# Patient Record
Sex: FEMALE | Race: WHITE
[De-identification: names, ages, dates, MRNs, and addresses within clinical notes are randomized per-mention and may not be internally consistent; named-entity substitution may affect disease eponyms.]

---

## 2017-03-06 ENCOUNTER — APPOINTMENT (OUTPATIENT)
Dept: DERMATOLOGY | Facility: CLINIC | Age: 37
End: 2017-03-06

## 2017-03-06 DIAGNOSIS — Z87.2 PERSONAL HISTORY OF DISEASES OF THE SKIN AND SUBCUTANEOUS TISSUE: ICD-10-CM

## 2017-03-06 RX ORDER — FLUTICASONE PROPIONATE 50 UG/1
50 SPRAY, METERED NASAL
Qty: 16 | Refills: 0 | Status: DISCONTINUED | COMMUNITY
Start: 2017-01-08 | End: 2017-03-06

## 2017-03-06 RX ORDER — AZITHROMYCIN 250 MG/1
250 TABLET, FILM COATED ORAL
Qty: 6 | Refills: 0 | Status: DISCONTINUED | COMMUNITY
Start: 2017-01-08 | End: 2017-03-06

## 2018-03-09 ENCOUNTER — MEDICATION RENEWAL (OUTPATIENT)
Age: 38
End: 2018-03-09

## 2018-03-12 ENCOUNTER — APPOINTMENT (OUTPATIENT)
Dept: DERMATOLOGY | Facility: CLINIC | Age: 38
End: 2018-03-12
Payer: COMMERCIAL

## 2018-03-12 VITALS — BODY MASS INDEX: 41.01 KG/M2 | WEIGHT: 220 LBS | HEIGHT: 61.5 IN

## 2018-03-12 DIAGNOSIS — D22.9 MELANOCYTIC NEVI, UNSPECIFIED: ICD-10-CM

## 2018-03-12 DIAGNOSIS — L30.8 OTHER SPECIFIED DERMATITIS: ICD-10-CM

## 2018-03-12 PROCEDURE — 99214 OFFICE O/P EST MOD 30 MIN: CPT

## 2018-06-27 ENCOUNTER — APPOINTMENT (OUTPATIENT)
Dept: FAMILY MEDICINE | Facility: CLINIC | Age: 38
End: 2018-06-27

## 2018-07-19 ENCOUNTER — APPOINTMENT (OUTPATIENT)
Dept: OBGYN | Facility: CLINIC | Age: 38
End: 2018-07-19

## 2019-02-21 ENCOUNTER — APPOINTMENT (OUTPATIENT)
Dept: FAMILY MEDICINE | Facility: CLINIC | Age: 39
End: 2019-02-21

## 2019-03-02 ENCOUNTER — APPOINTMENT (OUTPATIENT)
Dept: FAMILY MEDICINE | Facility: CLINIC | Age: 39
End: 2019-03-02

## 2019-03-18 ENCOUNTER — APPOINTMENT (OUTPATIENT)
Dept: DERMATOLOGY | Facility: CLINIC | Age: 39
End: 2019-03-18

## 2019-04-27 ENCOUNTER — APPOINTMENT (OUTPATIENT)
Dept: OBGYN | Facility: CLINIC | Age: 39
End: 2019-04-27

## 2019-08-21 ENCOUNTER — RX RENEWAL (OUTPATIENT)
Age: 39
End: 2019-08-21

## 2020-07-14 ENCOUNTER — APPOINTMENT (OUTPATIENT)
Dept: FAMILY MEDICINE | Facility: CLINIC | Age: 40
End: 2020-07-14

## 2023-04-10 ENCOUNTER — NON-APPOINTMENT (OUTPATIENT)
Age: 43
End: 2023-04-10

## 2023-05-31 ENCOUNTER — APPOINTMENT (OUTPATIENT)
Dept: INTERNAL MEDICINE | Facility: CLINIC | Age: 43
End: 2023-05-31
Payer: MEDICAID

## 2023-05-31 ENCOUNTER — NON-APPOINTMENT (OUTPATIENT)
Age: 43
End: 2023-05-31

## 2023-05-31 ENCOUNTER — TRANSCRIPTION ENCOUNTER (OUTPATIENT)
Age: 43
End: 2023-05-31

## 2023-05-31 VITALS
BODY MASS INDEX: 46.41 KG/M2 | HEART RATE: 89 BPM | DIASTOLIC BLOOD PRESSURE: 131 MMHG | RESPIRATION RATE: 16 BRPM | SYSTOLIC BLOOD PRESSURE: 185 MMHG | TEMPERATURE: 98 F | WEIGHT: 249 LBS | OXYGEN SATURATION: 98 % | HEIGHT: 61.5 IN

## 2023-05-31 VITALS — DIASTOLIC BLOOD PRESSURE: 120 MMHG | SYSTOLIC BLOOD PRESSURE: 200 MMHG

## 2023-05-31 DIAGNOSIS — Z00.00 ENCOUNTER FOR GENERAL ADULT MEDICAL EXAMINATION W/OUT ABNORMAL FINDINGS: ICD-10-CM

## 2023-05-31 DIAGNOSIS — Z23 ENCOUNTER FOR IMMUNIZATION: ICD-10-CM

## 2023-05-31 PROCEDURE — 99386 PREV VISIT NEW AGE 40-64: CPT | Mod: 25

## 2023-05-31 PROCEDURE — 36415 COLL VENOUS BLD VENIPUNCTURE: CPT

## 2023-05-31 PROCEDURE — 93000 ELECTROCARDIOGRAM COMPLETE: CPT

## 2023-05-31 NOTE — HEALTH RISK ASSESSMENT
[Yes] : Yes [Monthly or less (1 pt)] : Monthly or less (1 point) [1 or 2 (0 pts)] : 1 or 2 (0 points) [Never (0 pts)] : Never (0 points) [No] : In the past 12 months have you used drugs other than those required for medical reasons? No [0] : 2) Feeling down, depressed, or hopeless: Not at all (0) [PHQ-2 Negative - No further assessment needed] : PHQ-2 Negative - No further assessment needed [Patient reported PAP Smear was normal] : Patient reported PAP Smear was normal [HIV Test offered] : HIV Test offered [Hepatitis C test offered] : Hepatitis C test offered [With Family] : lives with family [Employed] : employed [Single] : single [Never] : Never [Audit-CScore] : 1 [[id number]] : 0 [PapSmearDate] : 01/2019 [PapSmearComments] : she has upcoming appt with gyn.  [FreeTextEntry2] :  of  center

## 2023-05-31 NOTE — PAST MEDICAL HISTORY
[Menstruating] : menstruating [Definite ___ (Date)] : the last menstrual period was [unfilled] [Total Preg ___] : G[unfilled] [Living ___] : Living: [unfilled] [AB Induced ___] : elective abortions: [unfilled]  [AB Spont ___] : miscarriages: [unfilled]

## 2023-05-31 NOTE — HISTORY OF PRESENT ILLNESS
[FreeTextEntry1] : cpe [de-identified] : 42y F w/ PMhx anxiety presenting to establish care/ cpe. \par \par she was prescribed a medication in the past a few years ago for HTN but she had issues with insurance and did not pick it up. \par \par anxiety: she takes lexapro 10mg once daily. she sees psych Dr. Miya Quintero in [place]. sees via telehealth. \par \par takes elderberry gummies, mvt\par \par she applies ketoconazole cream to her face for dermatitis. \par \par she was sent to a cardiologist 10 years ago bc of an abnormal EKG. she did see a cardiologist who repeated it and she was told it was normal. ECHO was also done which was normal. \par \par salty diet. she drinks iced tea occasionally. does not take nsaids regularly. has gained 25 lb since covid. her diet worsened since.

## 2023-05-31 NOTE — ASSESSMENT
[FreeTextEntry1] : Physical Exam:\par Constitutional: No acute distress, well appearing\par HEENT: Normocephalic, atraumatic\par Neck: supple\par Cardiac:  Regular rate and rhythm, No murmurs\par Pulmonary: No respiratory distress, Lungs clear to auscultation bilaterally, no wheezing, rales, or rhonchi\par Abdomen: Soft, non-tender, non-distended, no guarding, normal bowel sounds\par Vascular: No peripheral edema\par Neurology: Coordination grossly intact, no focal deficits\par Psychiatric: Alert and oriented x3, normal mood\par \par \par \par A/P\par HCM:\par - she will get fasting bloodwork done at lab\par - TDAP- will get today\par - Breast CA screening- due, given script\par - cervical CA screening- due, she has upcoming appt with gyn\par - has work forms to be completed- will get PPD and have it read in 48hours. \par \par HTN\par BP very elevated\par asymptomatic\par EKG wnl\par -rec low salt diet\par - will send amlodpine 5mg daily\par - pt knows to call the office if develops any new sx, lightheaded, dizziness, leg swelling\par - f/u 1month\par \par anxiety: \par controlled\par - c/w lexapro as directed by psych\par \par obesity\par - discussed lifestyle modif including healthy dieting and exercise\par - nutritioniste referral provided

## 2023-06-01 LAB
ALBUMIN SERPL ELPH-MCNC: 4.4 G/DL
ALP BLD-CCNC: 107 U/L
ALT SERPL-CCNC: 32 U/L
ANION GAP SERPL CALC-SCNC: 21 MMOL/L
AST SERPL-CCNC: 34 U/L
BILIRUB SERPL-MCNC: 0.5 MG/DL
BUN SERPL-MCNC: 12 MG/DL
CALCIUM SERPL-MCNC: 10 MG/DL
CHLORIDE SERPL-SCNC: 103 MMOL/L
CHOLEST SERPL-MCNC: 235 MG/DL
CO2 SERPL-SCNC: 20 MMOL/L
CREAT SERPL-MCNC: 1.13 MG/DL
EGFR: 62 ML/MIN/1.73M2
ESTIMATED AVERAGE GLUCOSE: 128 MG/DL
GLUCOSE SERPL-MCNC: 92 MG/DL
HBA1C MFR BLD HPLC: 6.1 %
HCV AB SER QL: NONREACTIVE
HCV S/CO RATIO: 0.12 S/CO
HDLC SERPL-MCNC: 49 MG/DL
HIV1+2 AB SPEC QL IA.RAPID: NONREACTIVE
LDLC SERPL CALC-MCNC: 156 MG/DL
NONHDLC SERPL-MCNC: 186 MG/DL
POTASSIUM SERPL-SCNC: 4.9 MMOL/L
PROT SERPL-MCNC: 7.1 G/DL
SODIUM SERPL-SCNC: 143 MMOL/L
T4 FREE SERPL-MCNC: 1.2 NG/DL
TRIGL SERPL-MCNC: 147 MG/DL
TSH SERPL-ACNC: 1.98 UIU/ML

## 2023-06-05 ENCOUNTER — NON-APPOINTMENT (OUTPATIENT)
Age: 43
End: 2023-06-05

## 2023-06-05 ENCOUNTER — MED ADMIN CHARGE (OUTPATIENT)
Age: 43
End: 2023-06-05

## 2023-06-05 ENCOUNTER — APPOINTMENT (OUTPATIENT)
Dept: FAMILY MEDICINE | Facility: CLINIC | Age: 43
End: 2023-06-05
Payer: MEDICAID

## 2023-06-05 PROCEDURE — 90715 TDAP VACCINE 7 YRS/> IM: CPT

## 2023-06-05 PROCEDURE — 86580 TB INTRADERMAL TEST: CPT

## 2023-06-05 PROCEDURE — 90471 IMMUNIZATION ADMIN: CPT

## 2023-06-06 ENCOUNTER — FORM ENCOUNTER (OUTPATIENT)
Age: 43
End: 2023-06-06

## 2023-06-06 ENCOUNTER — OUTPATIENT (OUTPATIENT)
Dept: OUTPATIENT SERVICES | Facility: HOSPITAL | Age: 43
LOS: 1 days | End: 2023-06-06
Payer: COMMERCIAL

## 2023-06-06 ENCOUNTER — RESULT REVIEW (OUTPATIENT)
Age: 43
End: 2023-06-06

## 2023-06-06 ENCOUNTER — APPOINTMENT (OUTPATIENT)
Dept: MAMMOGRAPHY | Facility: CLINIC | Age: 43
End: 2023-06-06
Payer: MEDICAID

## 2023-06-06 DIAGNOSIS — Z00.00 ENCOUNTER FOR GENERAL ADULT MEDICAL EXAMINATION WITHOUT ABNORMAL FINDINGS: ICD-10-CM

## 2023-06-06 PROCEDURE — 77067 SCR MAMMO BI INCL CAD: CPT | Mod: 26

## 2023-06-06 PROCEDURE — 77063 BREAST TOMOSYNTHESIS BI: CPT

## 2023-06-06 PROCEDURE — 77063 BREAST TOMOSYNTHESIS BI: CPT | Mod: 26

## 2023-06-06 PROCEDURE — 77067 SCR MAMMO BI INCL CAD: CPT

## 2023-06-09 ENCOUNTER — TRANSCRIPTION ENCOUNTER (OUTPATIENT)
Age: 43
End: 2023-06-09

## 2023-06-27 ENCOUNTER — APPOINTMENT (OUTPATIENT)
Dept: INTERNAL MEDICINE | Facility: CLINIC | Age: 43
End: 2023-06-27
Payer: MEDICAID

## 2023-06-27 VITALS
BODY MASS INDEX: 46.41 KG/M2 | SYSTOLIC BLOOD PRESSURE: 142 MMHG | WEIGHT: 249 LBS | OXYGEN SATURATION: 98 % | HEIGHT: 61.5 IN | HEART RATE: 79 BPM | RESPIRATION RATE: 16 BRPM | TEMPERATURE: 98.6 F | DIASTOLIC BLOOD PRESSURE: 92 MMHG

## 2023-06-27 VITALS — SYSTOLIC BLOOD PRESSURE: 160 MMHG | DIASTOLIC BLOOD PRESSURE: 100 MMHG

## 2023-06-27 DIAGNOSIS — D72.829 ELEVATED WHITE BLOOD CELL COUNT, UNSPECIFIED: ICD-10-CM

## 2023-06-27 LAB
CHOLEST SERPL-MCNC: 270 MG/DL
HDLC SERPL-MCNC: 50 MG/DL
LDLC SERPL CALC-MCNC: 196 MG/DL
NONHDLC SERPL-MCNC: 220 MG/DL
TRIGL SERPL-MCNC: 119 MG/DL

## 2023-06-27 PROCEDURE — 36415 COLL VENOUS BLD VENIPUNCTURE: CPT

## 2023-06-27 PROCEDURE — 99214 OFFICE O/P EST MOD 30 MIN: CPT | Mod: 25

## 2023-06-27 NOTE — ASSESSMENT
[FreeTextEntry1] : Physical Exam:\par Constitutional: No acute distress, well appearing\par HEENT: Normocephalic, atraumatic\par Neck: supple\par Cardiac:  Regular rate and rhythm, No murmurs\par Pulmonary: No respiratory distress, Lungs clear to auscultation bilaterally, no wheezing, rales, or rhonchi\par Abdomen: Soft, non-tender, non-distended, no guarding, normal bowel sounds\par Vascular: No peripheral edema\par Neurology: Coordination grossly intact, no focal deficits\par Psychiatric: Alert and oriented x3, normal mood\par \par \par \par A/P:\par anxiety:\par mood controlled\par - c/w lexapro as per psych\par \par HTN: \par improved albeit still elevated\par - will increase amlodipine to 10mg \par - f/u 1 month for BP recheck\par \par preDM:\par - discussed lifestyle modif including healthy diet and exercise regualrly. \par - will check a1c again in 3 months/ septmeber\par \par HLD \par was elev last visit but not done fasting\par she is fasting today\par - will recheck today\par \par bmi 45-\par - f/u bariatrics\par \par leukocytosis\par mild, seen on labs from last month\par asymptomatic\par - will recheck today

## 2023-06-27 NOTE — HISTORY OF PRESENT ILLNESS
[FreeTextEntry1] : f/u [de-identified] : 42y f w/ pMHx anxiety, HTN, HLD, preDM, presenting for f/u.\par \par HTN: started on amlo 5 last visit. complaint w/ her medication. takes in the morning.  today is 1 month f/u. states she borrowed a BP machine from the [employer] and found that her BP has gradually been improving.\par \par bmi 45- she has not met with the nutritionist however she is interested in meeting with the bariatrics team now for nonsurgical options as she admits shes been having a lot of difficulty with weight loss.

## 2023-07-27 ENCOUNTER — APPOINTMENT (OUTPATIENT)
Dept: INTERNAL MEDICINE | Facility: CLINIC | Age: 43
End: 2023-07-27
Payer: MEDICAID

## 2023-07-27 VITALS
HEART RATE: 70 BPM | DIASTOLIC BLOOD PRESSURE: 97 MMHG | TEMPERATURE: 98 F | BODY MASS INDEX: 46.41 KG/M2 | SYSTOLIC BLOOD PRESSURE: 139 MMHG | OXYGEN SATURATION: 99 % | HEIGHT: 61.5 IN | WEIGHT: 249 LBS | RESPIRATION RATE: 15 BRPM

## 2023-07-27 PROCEDURE — 99214 OFFICE O/P EST MOD 30 MIN: CPT

## 2023-07-27 NOTE — ASSESSMENT
[FreeTextEntry1] : Physical Exam:\par Constitutional: No acute distress, well appearing\par HEENT: Normocephalic, atraumatic\par Neck: supple\par Cardiac:  Regular rate and rhythm, No murmurs\par Pulmonary: No respiratory distress, Lungs clear to auscultation bilaterally, no wheezing, rales, or rhonchi\par Abdomen: Soft, non-tender, non-distended, no guarding, normal bowel sounds\par Vascular: No peripheral edema\par Neurology: Coordination grossly intact, no focal deficits\par Psychiatric: Alert and oriented x3, normal mood\par \par \par \par A/P:\par HTN: \par BP has significantly improved since initial visit however still above goal\par EKG done in may wnl\par asymptomatic\par - pt agreeable to add lisinopril 10mg daily\par - pt w/ multiple cardiovascular risk factors including obesity, HTN, HLD, preDM and family hx cardiac disease. she was recommended for cardiac evaluation- pt agreeable, given referral\par - rec to f/u 6 weeks for reevaluation. rec to rtc sooner or call the office if develops any dizziness, lightheadedness or sig changes in her BP in the interim. rec to continue to monitor her BP at home. \par \par anxiety: \par - c/w lexapro as directed by psych\par \par preDM;\par - c/w lifestyle modif including healthy dieting and exercise. rec to cut down carb use and weight loss\par - will check a1c at next visit\par \par HLD; \par - c/w atorvastatin\par - rec low cholesterol diet and weight loss\par - will check fasting lipids at next visit\par \par BMI 45\par she is making effort to lose weight\par - rec to c/w llifestyle modif and weight loss

## 2023-08-02 ENCOUNTER — TRANSCRIPTION ENCOUNTER (OUTPATIENT)
Age: 43
End: 2023-08-02

## 2023-08-15 ENCOUNTER — TRANSCRIPTION ENCOUNTER (OUTPATIENT)
Age: 43
End: 2023-08-15

## 2023-09-06 ENCOUNTER — APPOINTMENT (OUTPATIENT)
Dept: DERMATOLOGY | Facility: CLINIC | Age: 43
End: 2023-09-06
Payer: MEDICAID

## 2023-09-06 ENCOUNTER — NON-APPOINTMENT (OUTPATIENT)
Age: 43
End: 2023-09-06

## 2023-09-06 DIAGNOSIS — L21.9 SEBORRHEIC DERMATITIS, UNSPECIFIED: ICD-10-CM

## 2023-09-06 DIAGNOSIS — D18.01 HEMANGIOMA OF SKIN AND SUBCUTANEOUS TISSUE: ICD-10-CM

## 2023-09-06 DIAGNOSIS — D22.5 MELANOCYTIC NEVI OF TRUNK: ICD-10-CM

## 2023-09-06 DIAGNOSIS — L82.0 INFLAMED SEBORRHEIC KERATOSIS: ICD-10-CM

## 2023-09-06 PROCEDURE — 17110 DESTRUCTION B9 LES UP TO 14: CPT

## 2023-09-06 PROCEDURE — 99204 OFFICE O/P NEW MOD 45 MIN: CPT | Mod: 25

## 2023-09-06 RX ORDER — MOMETASONE FUROATE 1 MG/G
0.1 OINTMENT TOPICAL
Qty: 1 | Refills: 0 | Status: DISCONTINUED | COMMUNITY
Start: 2018-03-12 | End: 2023-09-06

## 2023-09-06 NOTE — ASSESSMENT
[FreeTextEntry1] : Complete skin examination is negative for malignancy; Multiple new concerns were addressed and discussed. Therapeutic options and their risks and benefits; along with multiple diagnostic possibilities were discussed at length; risks and benefits of skin biopsy and/or other further study were discussed;  cryo to inflamed SK R thigh  renew ketoconazole cream for face, mometasone soln for ears- seb derm  Continue regular exams; Follow up for TBSE in 1 year

## 2023-09-06 NOTE — HISTORY OF PRESENT ILLNESS
[de-identified] : Pt. presents for skin check; c/o few spots of concern;  irritated growth R thigh Severity:  mild   Modifying factors:  none Associated symptoms:  none Context:  no association with activity  also:  hx seb derm tx with Rxs;

## 2023-09-06 NOTE — PHYSICAL EXAM
[Full Body Skin Exam Performed] : performed [FreeTextEntry3] : Skin examination performed of the face, neck, trunk, arms, legs;  The patient is well, alert and oriented, pleasant and cooperative. Eyelids, conjunctivae, oral mucosa, digits and nails all normal.   No cervical adenopathy.  Normal findings include:  Multiple benign nevi were noted.  Scaling patches located on scalp;  Angiomas Lentigines + inflamed, waxy, keratotic papule; R anterior thigh  No lesions were suspicious for malignancy.

## 2023-09-28 ENCOUNTER — RX RENEWAL (OUTPATIENT)
Age: 43
End: 2023-09-28

## 2023-09-29 ENCOUNTER — TRANSCRIPTION ENCOUNTER (OUTPATIENT)
Age: 43
End: 2023-09-29

## 2023-10-03 ENCOUNTER — NON-APPOINTMENT (OUTPATIENT)
Age: 43
End: 2023-10-03

## 2023-10-04 ENCOUNTER — APPOINTMENT (OUTPATIENT)
Dept: INTERNAL MEDICINE | Facility: CLINIC | Age: 43
End: 2023-10-04
Payer: MEDICAID

## 2023-10-04 VITALS
TEMPERATURE: 97.6 F | HEIGHT: 61.5 IN | DIASTOLIC BLOOD PRESSURE: 84 MMHG | WEIGHT: 242 LBS | RESPIRATION RATE: 15 BRPM | OXYGEN SATURATION: 97 % | SYSTOLIC BLOOD PRESSURE: 128 MMHG | HEART RATE: 75 BPM | BODY MASS INDEX: 45.11 KG/M2

## 2023-10-04 DIAGNOSIS — R73.03 PREDIABETES.: ICD-10-CM

## 2023-10-04 DIAGNOSIS — M62.830 MUSCLE SPASM OF BACK: ICD-10-CM

## 2023-10-04 PROCEDURE — 36415 COLL VENOUS BLD VENIPUNCTURE: CPT

## 2023-10-04 PROCEDURE — G0008: CPT

## 2023-10-04 PROCEDURE — 90686 IIV4 VACC NO PRSV 0.5 ML IM: CPT

## 2023-10-04 PROCEDURE — 99214 OFFICE O/P EST MOD 30 MIN: CPT | Mod: 25

## 2023-10-05 LAB
ALBUMIN SERPL ELPH-MCNC: 4.2 G/DL
ALP BLD-CCNC: 99 U/L
ALT SERPL-CCNC: 22 U/L
ANION GAP SERPL CALC-SCNC: 13 MMOL/L
AST SERPL-CCNC: 21 U/L
BILIRUB SERPL-MCNC: 0.3 MG/DL
BUN SERPL-MCNC: 13 MG/DL
CALCIUM SERPL-MCNC: 9.4 MG/DL
CHLORIDE SERPL-SCNC: 104 MMOL/L
CHOLEST SERPL-MCNC: 166 MG/DL
CO2 SERPL-SCNC: 22 MMOL/L
CREAT SERPL-MCNC: 1.01 MG/DL
EGFR: 71 ML/MIN/1.73M2
ESTIMATED AVERAGE GLUCOSE: 131 MG/DL
GLUCOSE SERPL-MCNC: 105 MG/DL
HBA1C MFR BLD HPLC: 6.2 %
HDLC SERPL-MCNC: 45 MG/DL
LDLC SERPL CALC-MCNC: 105 MG/DL
NONHDLC SERPL-MCNC: 121 MG/DL
POTASSIUM SERPL-SCNC: 4.4 MMOL/L
PROT SERPL-MCNC: 6.6 G/DL
SODIUM SERPL-SCNC: 139 MMOL/L
TRIGL SERPL-MCNC: 81 MG/DL

## 2023-10-23 ENCOUNTER — APPOINTMENT (OUTPATIENT)
Dept: OBGYN | Facility: CLINIC | Age: 43
End: 2023-10-23
Payer: MEDICAID

## 2023-10-23 VITALS
DIASTOLIC BLOOD PRESSURE: 84 MMHG | HEART RATE: 68 BPM | SYSTOLIC BLOOD PRESSURE: 130 MMHG | BODY MASS INDEX: 44.73 KG/M2 | WEIGHT: 240 LBS | RESPIRATION RATE: 16 BRPM | HEIGHT: 61.5 IN

## 2023-10-23 DIAGNOSIS — Z01.419 ENCOUNTER FOR GYNECOLOGICAL EXAMINATION (GENERAL) (ROUTINE) W/OUT ABNORMAL FINDINGS: ICD-10-CM

## 2023-10-23 PROCEDURE — 99386 PREV VISIT NEW AGE 40-64: CPT

## 2023-10-25 LAB — HPV HIGH+LOW RISK DNA PNL CVX: NOT DETECTED

## 2023-10-26 ENCOUNTER — TRANSCRIPTION ENCOUNTER (OUTPATIENT)
Age: 43
End: 2023-10-26

## 2023-10-26 LAB — CYTOLOGY CVX/VAG DOC THIN PREP: NORMAL

## 2023-10-30 ENCOUNTER — TRANSCRIPTION ENCOUNTER (OUTPATIENT)
Age: 43
End: 2023-10-30

## 2023-11-03 ENCOUNTER — NON-APPOINTMENT (OUTPATIENT)
Age: 43
End: 2023-11-03

## 2023-11-03 ENCOUNTER — APPOINTMENT (OUTPATIENT)
Dept: CARDIOLOGY | Facility: CLINIC | Age: 43
End: 2023-11-03

## 2023-11-14 ENCOUNTER — TRANSCRIPTION ENCOUNTER (OUTPATIENT)
Age: 43
End: 2023-11-14

## 2023-11-23 ENCOUNTER — NON-APPOINTMENT (OUTPATIENT)
Age: 43
End: 2023-11-23

## 2023-11-24 ENCOUNTER — TRANSCRIPTION ENCOUNTER (OUTPATIENT)
Age: 43
End: 2023-11-24

## 2023-12-12 ENCOUNTER — APPOINTMENT (OUTPATIENT)
Dept: DERMATOLOGY | Facility: CLINIC | Age: 43
End: 2023-12-12
Payer: MEDICAID

## 2023-12-12 DIAGNOSIS — D48.5 NEOPLASM OF UNCERTAIN BEHAVIOR OF SKIN: ICD-10-CM

## 2023-12-12 DIAGNOSIS — D22.30 MELANOCYTIC NEVI OF UNSPECIFIED PART OF FACE: ICD-10-CM

## 2023-12-12 PROCEDURE — 99213 OFFICE O/P EST LOW 20 MIN: CPT | Mod: 25

## 2023-12-12 PROCEDURE — 11102 TANGNTL BX SKIN SINGLE LES: CPT

## 2023-12-19 ENCOUNTER — TRANSCRIPTION ENCOUNTER (OUTPATIENT)
Age: 43
End: 2023-12-19

## 2023-12-19 LAB — CORE LAB BIOPSY: NORMAL

## 2024-01-17 ENCOUNTER — APPOINTMENT (OUTPATIENT)
Dept: INTERNAL MEDICINE | Facility: CLINIC | Age: 44
End: 2024-01-17
Payer: MEDICAID

## 2024-01-17 VITALS
HEART RATE: 78 BPM | TEMPERATURE: 97.6 F | DIASTOLIC BLOOD PRESSURE: 85 MMHG | OXYGEN SATURATION: 99 % | SYSTOLIC BLOOD PRESSURE: 137 MMHG | HEIGHT: 61.5 IN | WEIGHT: 245 LBS | BODY MASS INDEX: 45.66 KG/M2 | RESPIRATION RATE: 15 BRPM

## 2024-01-17 PROCEDURE — 36415 COLL VENOUS BLD VENIPUNCTURE: CPT

## 2024-01-17 PROCEDURE — 99214 OFFICE O/P EST MOD 30 MIN: CPT | Mod: 25

## 2024-01-17 NOTE — HISTORY OF PRESENT ILLNESS
[FreeTextEntry1] : f/u [de-identified] :   RUBIN STYLES is a 43 year old female with PMHx anxiety, HTN, HLD, preDM, obesity, presenting for f/u.

## 2024-01-17 NOTE — ASSESSMENT
[FreeTextEntry1] : Physical Exam: Constitutional: No acute distress, well appearing HEENT: Normocephalic, atraumatic Neck: supple Cardiac: Regular rate and rhythm, No murmurs Pulmonary: No respiratory distress, Lungs clear to auscultation bilaterally, no wheezing, rales, or rhonchi Abdomen: Soft, non-tender, non-distended, no guarding, normal bowel sounds Vascular: No peripheral edema Neurology: Coordination grossly intact, no focal deficits Psychiatric: Alert and oriented x3, normal mood       A/P: HTN: on lisinopril 10mg and amlodipine 10mg daily bp a little elevated but still within acceptable range pt w/ multiple cardiovascular risk factors including obesity, HTN, HLD, preDM and family hx cardiac disease. she was recommended for cardiac evaluation- states she did not go yet, provided referral again today - c/w lisinopril 10mg daily and amlodipine 10mg daily - salt and caffeine reduction - f/u 3 months   anxiety: on lexapro 10mg daily, prescribed by her psychiatrist. - c/w lexapro as directed by psych  preDM; last a1c 6.2 with slight increase from labs october - c/w lifestyle modif including healthy dieting and exercise. rec to cut down carb use and weight loss - will check today- bw drawn in office  HLD; lipid improvement, october labs - rec low cholesterol diet and continued weight loss - c/w atorvastatin 40mg daily  BMI 45 she has upcoming visit with Dr Villafana for weight loss - c/w lifestyle modif

## 2024-01-17 NOTE — HISTORY OF PRESENT ILLNESS
[FreeTextEntry1] : f/u [de-identified] :   RUBIN STYLES is a 43 year old female with PMHx anxiety, HTN, HLD, preDM, obesity, presenting for f/u.

## 2024-01-18 LAB
ALBUMIN SERPL ELPH-MCNC: 4.2 G/DL
ALP BLD-CCNC: 94 U/L
ALT SERPL-CCNC: 15 U/L
ANION GAP SERPL CALC-SCNC: 15 MMOL/L
AST SERPL-CCNC: 16 U/L
BASOPHILS # BLD AUTO: 0.08 K/UL
BASOPHILS NFR BLD AUTO: 1.1 %
BILIRUB SERPL-MCNC: 0.5 MG/DL
BUN SERPL-MCNC: 13 MG/DL
CALCIUM SERPL-MCNC: 9.4 MG/DL
CHLORIDE SERPL-SCNC: 102 MMOL/L
CO2 SERPL-SCNC: 21 MMOL/L
CREAT SERPL-MCNC: 0.9 MG/DL
EGFR: 81 ML/MIN/1.73M2
EOSINOPHIL # BLD AUTO: 0.41 K/UL
EOSINOPHIL NFR BLD AUTO: 5.5 %
ESTIMATED AVERAGE GLUCOSE: 123 MG/DL
GLUCOSE SERPL-MCNC: 82 MG/DL
HBA1C MFR BLD HPLC: 5.9 %
HCT VFR BLD CALC: 41.4 %
HGB BLD-MCNC: 13.4 G/DL
IMM GRANULOCYTES NFR BLD AUTO: 0.3 %
LYMPHOCYTES # BLD AUTO: 2.65 K/UL
LYMPHOCYTES NFR BLD AUTO: 35.5 %
MAN DIFF?: NORMAL
MCHC RBC-ENTMCNC: 27.1 PG
MCHC RBC-ENTMCNC: 32.4 GM/DL
MCV RBC AUTO: 83.8 FL
MONOCYTES # BLD AUTO: 0.41 K/UL
MONOCYTES NFR BLD AUTO: 5.5 %
NEUTROPHILS # BLD AUTO: 3.9 K/UL
NEUTROPHILS NFR BLD AUTO: 52.1 %
PLATELET # BLD AUTO: 387 K/UL
POTASSIUM SERPL-SCNC: 4.8 MMOL/L
PROT SERPL-MCNC: 6.7 G/DL
RBC # BLD: 4.94 M/UL
RBC # FLD: 14 %
SODIUM SERPL-SCNC: 138 MMOL/L
WBC # FLD AUTO: 7.47 K/UL

## 2024-01-19 ENCOUNTER — TRANSCRIPTION ENCOUNTER (OUTPATIENT)
Age: 44
End: 2024-01-19

## 2024-01-20 ENCOUNTER — NON-APPOINTMENT (OUTPATIENT)
Age: 44
End: 2024-01-20

## 2024-02-01 ENCOUNTER — APPOINTMENT (OUTPATIENT)
Dept: BARIATRICS | Facility: CLINIC | Age: 44
End: 2024-02-01
Payer: MEDICAID

## 2024-02-01 VITALS
HEIGHT: 61 IN | HEART RATE: 74 BPM | WEIGHT: 249 LBS | SYSTOLIC BLOOD PRESSURE: 140 MMHG | DIASTOLIC BLOOD PRESSURE: 86 MMHG | TEMPERATURE: 96.7 F | OXYGEN SATURATION: 99 % | BODY MASS INDEX: 47.01 KG/M2

## 2024-02-01 DIAGNOSIS — Z82.49 FAMILY HISTORY OF ISCHEMIC HEART DISEASE AND OTHER DISEASES OF THE CIRCULATORY SYSTEM: ICD-10-CM

## 2024-02-01 DIAGNOSIS — Z83.3 FAMILY HISTORY OF DIABETES MELLITUS: ICD-10-CM

## 2024-02-01 DIAGNOSIS — Z80.51 FAMILY HISTORY OF MALIGNANT NEOPLASM OF KIDNEY: ICD-10-CM

## 2024-02-01 PROCEDURE — 99204 OFFICE O/P NEW MOD 45 MIN: CPT

## 2024-02-01 RX ORDER — METHOCARBAMOL 500 MG/1
500 TABLET, FILM COATED ORAL
Qty: 14 | Refills: 0 | Status: DISCONTINUED | COMMUNITY
Start: 2023-10-04 | End: 2024-02-01

## 2024-02-01 RX ORDER — NALTREXONE HYDROCHLORIDE 50 MG/1
50 TABLET, FILM COATED ORAL DAILY
Qty: 15 | Refills: 0 | Status: COMPLETED | COMMUNITY
Start: 2024-02-01 | End: 2024-03-02

## 2024-02-01 NOTE — HISTORY OF PRESENT ILLNESS
[de-identified] : RUBIN STYLES is a 43 year old F with a long-standing h/o obesity, who presents today for initial evaluation for weight management options.    Reports no history of substance abuse, uncontrolled blood pressure, kidney stones, or pancreatitis; reports a Hx of anxiety. Reports no family history of thyroid cancer or MEN 2 syndrome.   Heaviest/current wt 250 lbs, lowest wt 108 lbs. Goal weight: 125 lbs Diets/exercise programs tried in the past: WW, intermittent fasting Weight loss medications tried in the past: None Reason for stopping weight loss medication if applicable: N/A   History of bariatric surgery: No Obesity comorbidities: HTN, HLD, LINDA (not using CPAP) Comorbidities improved/resolved: N/A Anti-obesity medication: None Obesity medication side effects: N/A   No abdominal pain, reflux, nausea, vomiting, constipation, or diarrhea.   Current dietary lifestyle: Breakfast: Skips or coffee with creamer, sometimes a bagel, doughnut, or hash browns  Lunch: [employer] tomato soup, grilled cheese, chips, fettuccini isabella, grilled chicken and penne with balsamic  Dinner: Take-out (e.g. pizza/fast food) Snacks: Candy Drinks: Water (trying to drink 64 oz water/day), seltzer, iced tea, Diet Coke, Ginger Ale (reports drinks ~1 sweet drink per day; e.g. 20 oz Diet Coke with lunch)  Activity Lifestyle:  Sleep: 6-7 hrs Physical activity/exercise: Daily activity (e.g. ~30 minutes of exercise/day per pt's Apple Watch) Work:  of  Center Smoking/ETOH:  Nonsmoker; no ETOH use

## 2024-02-01 NOTE — ASSESSMENT
[FreeTextEntry1] : RUBIN STYLES is a 43 year old F with a long-standing h/o obesity, who presents today for initial evaluation for weight management options.  I had a lengthy discussion with the patient regarding nutrition, exercise, weight loss medications, and bariatric surgery. The patient qualifies for bariatric surgery but is not interested at this time, however she is not opposed to possible surgery in the future. We reviewed surgical options such as the gastric Hugo-en-Y bypass and Sleeve Gastrectomy, and the risks and benefits of each. We discussed how bariatric surgery may offer greater weight loss results with better long-term success. The patient qualifies for and is currently most interested in weight loss medications. The following risks of bariatric surgery were discussed with the patient with diagrams. All questions answered.   Complications discussed include but are not limited to: vitamin and protein deficiencies, pneumonia, urinary infection, wound infection, leaks/peritonitis possibly requiring intraabdominal drains or reoperation, bleeding, DVT, pulmonary embolus, severe reflux, sleeve obstruction, abdominal wall hernias, revisions, death, inadequate weight loss. Increased risk of revision surgery was also discussed with the patient. The importance of vitamins and protein supplementation was stressed, as was the importance of follow-up and exercise.   Health maintenance and behavioral/nutrition counseling for obesity: An additional 30 minutes of the visit was spent counseling the patient regarding need for aggressive weight loss and behavior modification including nutrition and proper eating habits, including which foods to eat and avoid.   I emphasized the importance of making healthier food choices including fresh fruits and vegetables, lean meats, and protein sources. I recommended front loading calories, incorporating whole grains, and eliminating fast foods. I also discussed the importance of avoiding fried/fatty foods and foods containing high sugar content including juices/shakes/sodas/desserts.   I also encouraged beginning an exercise program and recommended cardiovascular exercises along with strength training to build lean muscle. I made suggestions on different types of exercises to try.   Patient will work on the following: -Complete blood work (ordered at today's visit) -Meet with nutritionist -Meet with the psychologist -Eliminate snacks -Focus on eating 3 well-balanced meals during the daytime with appropriate portion size (protein source list,   cooking Ideas packet, and protein shake list given to pt at today's visit) -Cooking fresh meals rather than take out/processed/ready-made foods -Incorporating exercise (exercise packet given to pt at today's visit)   I have discussed initiating Contrave therapy for pt. All risks and benefits have been discussed with the patient. Risks include but are not limited to nausea, vomiting, constipation, diarrhea and GI upset. Pt verbalizes understanding and wishes to proceed with medical therapy. Instructions for use provided.   Discussed with the pt of the warnings of pregnancy while on Contrave:  - instructed pt to avoid planned pregnancy and use contraception  - advised pt to stop Contrave immediately if becomes pregnant   Pt verbalized understanding of the above   Start Contrave based on blood work, authorization, and tolerance.   Patient educated to call with questions/concerns   All questions answered   Schedule follow-up for 3 weeks after starting weight loss medication.   Additional time spent before and after visit reviewing chart.   Pt seen with Dr Villafana

## 2024-02-01 NOTE — PHYSICAL EXAM
[Obese, well nourished, in no acute distress] : obese, well nourished, in no acute distress [Normal] : supple without JVD, no thyromegaly or masses appreciated [de-identified] : Equal chest rise, non-labored respirations, no audible wheezing. [de-identified] : soft, NT, ND, no evidence of hernia or diastasis; obese [de-identified] : DAIJA

## 2024-02-14 ENCOUNTER — APPOINTMENT (OUTPATIENT)
Dept: BARIATRICS/WEIGHT MGMT | Facility: CLINIC | Age: 44
End: 2024-02-14
Payer: MEDICAID

## 2024-02-14 DIAGNOSIS — R63.5 ABNORMAL WEIGHT GAIN: ICD-10-CM

## 2024-02-14 PROCEDURE — 97802 MEDICAL NUTRITION INDIV IN: CPT | Mod: 95

## 2024-02-15 VITALS — WEIGHT: 250 LBS | BODY MASS INDEX: 47.2 KG/M2 | HEIGHT: 61 IN

## 2024-02-15 PROBLEM — R63.5 WEIGHT GAIN: Status: ACTIVE | Noted: 2024-02-01

## 2024-02-21 ENCOUNTER — APPOINTMENT (OUTPATIENT)
Dept: BARIATRICS | Facility: CLINIC | Age: 44
End: 2024-02-21
Payer: MEDICAID

## 2024-02-21 VITALS
HEIGHT: 61 IN | OXYGEN SATURATION: 98 % | HEART RATE: 71 BPM | DIASTOLIC BLOOD PRESSURE: 78 MMHG | SYSTOLIC BLOOD PRESSURE: 136 MMHG | BODY MASS INDEX: 46.44 KG/M2 | WEIGHT: 246 LBS | TEMPERATURE: 96.7 F

## 2024-02-21 PROCEDURE — 99214 OFFICE O/P EST MOD 30 MIN: CPT

## 2024-02-21 RX ORDER — PHENTERMINE HYDROCHLORIDE 37.5 MG/1
37.5 TABLET ORAL DAILY
Qty: 15 | Refills: 0 | Status: COMPLETED | COMMUNITY
Start: 2024-02-21 | End: 2024-03-22

## 2024-02-21 RX ORDER — TOPIRAMATE 25 MG/1
25 TABLET, FILM COATED ORAL
Qty: 30 | Refills: 0 | Status: COMPLETED | COMMUNITY
Start: 2024-02-21 | End: 2024-03-22

## 2024-02-21 RX ORDER — BUPROPION HYDROCHLORIDE 150 MG/1
150 TABLET, FILM COATED ORAL DAILY
Refills: 0 | Status: DISCONTINUED | COMMUNITY
End: 2024-02-21

## 2024-02-21 NOTE — PHYSICAL EXAM
[Obese, well nourished, in no acute distress] : obese, well nourished, in no acute distress [Normal] : affect appropriate [de-identified] : Equal chest rise, non-labored respirations, no audible wheezing. [de-identified] : soft, NT, ND, no evidence of umbilical hernia or diastasis

## 2024-02-21 NOTE — ASSESSMENT
[FreeTextEntry1] : 43 year old F with a longstanding history of obesity presents for a weight-loss medication follow-up after having made nutrition and exercise changes since last visit. 3 weight loss since last visit 1 month ago. Pt took bupropion and naltrexone only 1 week and stopped due to nausea, headache and still muscles.   I had a lengthy discussion with the patient regarding nutrition, exercise, weight loss medications.   Patient will work on the following: -Meet with nutritionist and follow up with nutrition classes -Eliminate snacks -Increase zero calorie fluid intake to at least 64 oz/day -Focus on eating 3 well-balanced meals during the daytime with appropriate portion size. Discussed increasing protein intake. -Cooking fresh meals rather than take out/processed/ready-made foods -Incorporating exercise; walk 8-10k steps daily -Change medication from bupropion and naltrexone to phentermine and topiramate -Pt is aware that she must get a pregnancy test prior to starting medications. She states she is not sexually active. She will go for the pregnancy test today and not start medications until after the results come back.   Discussed with the pt of the warnings of pregnancy while on phentermine and topiramate. - instructed pt to avoid planned pregnancy and use of contraception - advised pt to stop immediately if becomes pregnant   Pt verbalized understanding of the above Pt will call office immediately for any side effects. Pt verbalizes understanding and wishes to proceed with medical therapy. Patient educated to call with questions/concerns. All questions answered.   Return to office 2-3 weeks.   Additional time spent before and after visit reviewing chart.

## 2024-02-21 NOTE — HISTORY OF PRESENT ILLNESS
[de-identified] : 43 year old F with a longstanding history of obesity presents for a weight-loss medication follow-up after having made nutrition and exercise changes since last visit. 3 weight loss since last visit 1 month ago. Pt took bupropion and naltrexone only 1 week and stopped due to nausea, headache and still muscles. Reports no abdominal pain, nausea/vomiting, constipation, diarrhea, reflux/heartburn currently.   Patient trying to eat 3 protein-rich meals/day (B:low fat yogurt, banana, coffee with 1/2 and 1/2, L: sandwich- cold cut turkey, 1 slice of american cheese, D: chicken, pasta, potato, rice, minimal vegetables) and snacking on 2 cookies per night, drinking adequate zero-calorie fluids/day, and exercising by daily activies; sleeps 6-8 hrs/night. Pt is interested in changing medication due to side effects.   Starting weight at initial consult: 249 Current weight at today's visit: 246   Anti-obesity medication(s): bupropion, naltrexone Start date: 2/1/24 Current dose:  bupropion 100 mg daily, naltrexone 25 mg daily Side-effects from anti-obesity medication(s): nausea, head ache, still muscles Obesity comorbidities: HTN, HLD Comorbidities improved/resolved: no History of bariatric surgery: no   Next nutritionist appointment 3/11/24

## 2024-02-22 ENCOUNTER — TRANSCRIPTION ENCOUNTER (OUTPATIENT)
Age: 44
End: 2024-02-22

## 2024-02-22 LAB — HCG SERPL-MCNC: <1 MIU/ML

## 2024-03-08 RX ORDER — BUPROPION HYDROCHLORIDE 100 MG/1
100 TABLET, FILM COATED, EXTENDED RELEASE ORAL
Qty: 42 | Refills: 0 | Status: DISCONTINUED | COMMUNITY
Start: 2024-02-01 | End: 2024-03-08

## 2024-03-11 ENCOUNTER — APPOINTMENT (OUTPATIENT)
Dept: BARIATRICS/WEIGHT MGMT | Facility: CLINIC | Age: 44
End: 2024-03-11

## 2024-03-13 ENCOUNTER — APPOINTMENT (OUTPATIENT)
Dept: BARIATRICS | Facility: CLINIC | Age: 44
End: 2024-03-13

## 2024-03-19 ENCOUNTER — APPOINTMENT (OUTPATIENT)
Dept: BARIATRICS/WEIGHT MGMT | Facility: CLINIC | Age: 44
End: 2024-03-19
Payer: MEDICAID

## 2024-03-19 DIAGNOSIS — Z78.9 OTHER SPECIFIED HEALTH STATUS: ICD-10-CM

## 2024-03-19 PROCEDURE — 90791 PSYCH DIAGNOSTIC EVALUATION: CPT | Mod: GT,95
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
## 2024-03-19 NOTE — HISTORY OF PRESENT ILLNESS
[Genetics] : genetics [Poor Choices] : poor choices [de-identified] : Pt's motivation for weight loss is to improve her health and avoid further medical sequelae of obesity.  Per pt,her highest weight is her current weight and her lowest weight was 110 lbs at 20 yrs old.  Her stated goal weight is 150-160 lbs or at least under 200 lbs.  First started on Contrave but c/o muscle tension/rigidity and switched to Phentermine/Topiramate.   [de-identified] : A current typical day of eating is reported as follows: B: previously DD bagel, hash browns or a donut w/coffee and half & half;  now yogurt L:  [employer] tomato soup,1/2 grilled cheese, chips and seltzer or diet soda D:  pizza or chicken nuggets or pasta Drinks water, seltzer and some diet soda. [de-identified] : none.  Pt denies ED hx and bxs, including binge eating.

## 2024-03-19 NOTE — CURRENT PSYCHIATRIC SYMPTOMS
[Depressed Mood] : no depressed mood [Decreased Concentration] : no decrease in concentrating ability [Euphoria] : no euphoria [Dec Need For Sleep] : no decreased need for sleep [Thought Disorder] : ~T a thought disorder was not noted [de-identified] : 6 hrs/sleep per night as she stays up late and needs to wake early; depression began during the pandemic (2020) and effectively treated with Lexapro [de-identified] : anxiety since childhood with excessive worries about her health leading to PAs improved on Lexapro [FreeTextEntry1] : Karyn saw a psychologist in her early 20s for anxiety and improving her eating choices. Also saw a therapist at [facility] sometime between [id number].  Trial of Lexapro briefly in 2010.  Also trial of Sertraline in 2016.  Since 2020, she's been in treatment with Miya Ocampo MD for anxiety and prescribed Lexapro.

## 2024-03-19 NOTE — PHYSICAL EXAM
[Normal] : good [AH] : no auditory hallucinations [VH] : no visual hallucinations [Suicidal Ideation] : no suicidal ideation [FreeTextEntry8] : "good"

## 2024-03-19 NOTE — DISCUSSION/SUMMARY
[Behavior plan developed] : Behavior plan developed [Addtnl Health & Behavior Intervention: Individual] : Additional Health and Behavior Intervention: Individual [Cognitive Behavioral psychotherapy to remove barriers to self-care] : Cognitive Behavioral psychotherapy to remove barriers to self-care. [FreeTextEntry1] : Karyn is a 43 yr old female referred by Dr. Villafana for behavioral weight loss counseling.  She attributes her obesity to poor dietary choices, relying on outsourced food, and avoidance of fruit and veggies. [de-identified] : Illness Anxiety Disorder Psychological factors (poor dietary choices) affecting other medical conditions (obesity and associated medical sequelae) Obesity Adjustment Disorder with Depressed Mood, In Remission R/O ARFID [de-identified] : weight loss of 1-2 lbs/wk incorporate fruits and veggies into her diet

## 2024-03-19 NOTE — REASON FOR VISIT
[Other Location: e.g. School (Enter Location, City,State)___] : at [unfilled], at the time of the visit. [Other Location: e.g. Home (Enter Location, City,State)___] : at [unfilled] [Patient] : the patient [Initial Consult] : an initial consult for [Morbid Obesity (BMI>40)] : morbid obesity (bmi>40) [Referring By:  ___] : ~[address]~ was referred for psychological evaluation by Dr. VILLEGAS [de-identified] : for evaluation and tx of psyc sxs related to obesity [de-identified] : Confidentiality and its limitations were explained.

## 2024-04-19 ENCOUNTER — RX RENEWAL (OUTPATIENT)
Age: 44
End: 2024-04-19

## 2024-04-25 ENCOUNTER — APPOINTMENT (OUTPATIENT)
Dept: INTERNAL MEDICINE | Facility: CLINIC | Age: 44
End: 2024-04-25
Payer: COMMERCIAL

## 2024-04-25 VITALS
HEART RATE: 86 BPM | BODY MASS INDEX: 47.2 KG/M2 | SYSTOLIC BLOOD PRESSURE: 125 MMHG | HEIGHT: 61 IN | OXYGEN SATURATION: 96 % | DIASTOLIC BLOOD PRESSURE: 84 MMHG | TEMPERATURE: 98.1 F | RESPIRATION RATE: 16 BRPM | WEIGHT: 250 LBS

## 2024-04-25 DIAGNOSIS — M54.50 LOW BACK PAIN, UNSPECIFIED: ICD-10-CM

## 2024-04-25 DIAGNOSIS — E66.01 MORBID (SEVERE) OBESITY DUE TO EXCESS CALORIES: ICD-10-CM

## 2024-04-25 DIAGNOSIS — F41.9 ANXIETY DISORDER, UNSPECIFIED: ICD-10-CM

## 2024-04-25 DIAGNOSIS — I10 ESSENTIAL (PRIMARY) HYPERTENSION: ICD-10-CM

## 2024-04-25 DIAGNOSIS — E78.5 HYPERLIPIDEMIA, UNSPECIFIED: ICD-10-CM

## 2024-04-25 DIAGNOSIS — R73.03 PREDIABETES.: ICD-10-CM

## 2024-04-25 PROCEDURE — G2211 COMPLEX E/M VISIT ADD ON: CPT

## 2024-04-25 PROCEDURE — 99214 OFFICE O/P EST MOD 30 MIN: CPT

## 2024-04-25 NOTE — HISTORY OF PRESENT ILLNESS
[FreeTextEntry1] : f/u [de-identified] : RUBIN STYLES is a 43 year old female with PMHx anxiety, HTN, HLD, preDM, obesity, presenting for f/u. also c/o low back pain

## 2024-05-07 ENCOUNTER — APPOINTMENT (OUTPATIENT)
Dept: BARIATRICS/WEIGHT MGMT | Facility: CLINIC | Age: 44
End: 2024-05-07

## 2024-05-09 ENCOUNTER — TRANSCRIPTION ENCOUNTER (OUTPATIENT)
Age: 44
End: 2024-05-09

## 2024-05-22 ENCOUNTER — OUTPATIENT (OUTPATIENT)
Dept: OUTPATIENT SERVICES | Facility: HOSPITAL | Age: 44
LOS: 1 days | End: 2024-05-22
Payer: COMMERCIAL

## 2024-05-22 ENCOUNTER — APPOINTMENT (OUTPATIENT)
Dept: RADIOLOGY | Facility: CLINIC | Age: 44
End: 2024-05-22
Payer: COMMERCIAL

## 2024-05-22 DIAGNOSIS — M54.50 LOW BACK PAIN, UNSPECIFIED: ICD-10-CM

## 2024-05-22 PROCEDURE — 72110 X-RAY EXAM L-2 SPINE 4/>VWS: CPT

## 2024-05-22 PROCEDURE — 72110 X-RAY EXAM L-2 SPINE 4/>VWS: CPT | Mod: 26

## 2024-05-24 ENCOUNTER — NON-APPOINTMENT (OUTPATIENT)
Age: 44
End: 2024-05-24

## 2024-06-13 ENCOUNTER — APPOINTMENT (OUTPATIENT)
Dept: PHYSICAL MEDICINE AND REHAB | Facility: CLINIC | Age: 44
End: 2024-06-13

## 2024-06-27 ENCOUNTER — APPOINTMENT (OUTPATIENT)
Dept: PHYSICAL MEDICINE AND REHAB | Facility: CLINIC | Age: 44
End: 2024-06-27
Payer: COMMERCIAL

## 2024-06-27 VITALS
RESPIRATION RATE: 14 BRPM | HEART RATE: 80 BPM | SYSTOLIC BLOOD PRESSURE: 114 MMHG | WEIGHT: 250 LBS | HEIGHT: 62 IN | DIASTOLIC BLOOD PRESSURE: 76 MMHG | BODY MASS INDEX: 46.01 KG/M2

## 2024-06-27 DIAGNOSIS — M47.816 SPONDYLOSIS W/OUT MYELOPATHY OR RADICULOPATHY, LUMBAR REGION: ICD-10-CM

## 2024-06-27 DIAGNOSIS — M51.36 OTHER INTERVERTEBRAL DISC DEGENERATION, LUMBAR REGION: ICD-10-CM

## 2024-06-27 PROCEDURE — G2211 COMPLEX E/M VISIT ADD ON: CPT

## 2024-06-27 PROCEDURE — 99204 OFFICE O/P NEW MOD 45 MIN: CPT

## 2024-07-12 ENCOUNTER — APPOINTMENT (OUTPATIENT)
Dept: MRI IMAGING | Facility: CLINIC | Age: 44
End: 2024-07-12

## 2024-07-18 ENCOUNTER — OUTPATIENT (OUTPATIENT)
Dept: OUTPATIENT SERVICES | Facility: HOSPITAL | Age: 44
LOS: 1 days | End: 2024-07-18
Payer: COMMERCIAL

## 2024-07-18 ENCOUNTER — APPOINTMENT (OUTPATIENT)
Dept: MRI IMAGING | Facility: CLINIC | Age: 44
End: 2024-07-18
Payer: COMMERCIAL

## 2024-07-18 DIAGNOSIS — M47.816 SPONDYLOSIS WITHOUT MYELOPATHY OR RADICULOPATHY, LUMBAR REGION: ICD-10-CM

## 2024-07-18 PROCEDURE — 72148 MRI LUMBAR SPINE W/O DYE: CPT

## 2024-07-18 PROCEDURE — 72148 MRI LUMBAR SPINE W/O DYE: CPT | Mod: 26

## 2024-07-29 ENCOUNTER — APPOINTMENT (OUTPATIENT)
Dept: PHYSICAL MEDICINE AND REHAB | Facility: CLINIC | Age: 44
End: 2024-07-29
Payer: COMMERCIAL

## 2024-07-29 DIAGNOSIS — M47.816 SPONDYLOSIS W/OUT MYELOPATHY OR RADICULOPATHY, LUMBAR REGION: ICD-10-CM

## 2024-07-29 PROCEDURE — 99214 OFFICE O/P EST MOD 30 MIN: CPT | Mod: 95

## 2024-07-29 PROCEDURE — G2211 COMPLEX E/M VISIT ADD ON: CPT | Mod: 95

## 2024-07-29 NOTE — DATA REVIEWED
[FreeTextEntry1] : EXAM: [id number] - MR SPINE LUMBAR  - ORDERED BY: KAREN OTTO   PROCEDURE DATE:  07/18/2024    INTERPRETATION:  MR LUMBAR SPINE WITHOUT CONTRAST  TECHNIQUE: Multiplanar multisequence imaging of the lumbar spine was performed without the administration of intravenous contrast.  CLINICAL INDICATION: Persistent low back pain for several months. Lumbar spondylosis. COMPARISON: Lumbar spine radiographs 5/22/2024. ____________________ FINDINGS: Mildly degraded by motion artifact. L5-S1 is at image 46 of series 10. There are 5 lumbar type vertebral bodies. Sacralization of L5 bilaterally.  Very mild lower lumbar levoscoliosis with an apex near L4. Very mild upper lumbar dextroscoliosis with an apex near L1-L2. Lumbar alignment otherwise maintained. Visualized vertebral body heights are maintained. No acute fractures. No aggressive osseous lesions.  Conus has a normal appearance and terminates at L1.  Decreased T2 signal of the majority of the lumbar disks compatible with degenerative disc disease.  T12-L1: Disc bulge. No significant disc height loss. No significant spinal canal or neuroforaminal stenosis. Very mild hypertrophic facet joint degeneration.  L1-L2: Disc bulge. No significant disc height loss. No significant spinal canal or neuroforaminal stenosis. Mild hypertrophic facet joint degeneration with bilateral facet joint effusions.  L2-L3: Disc bulge. No significant disc height loss. No significant spinal canal or neuroforaminal stenosis. Mild hypertrophic facet joint degeneration.  L3-L4: Disc bulge. No significant disc height loss. No significant spinal canal or neuroforaminal stenosis. Moderate hypertrophic facet joint degeneration.  L4-L5: Disc bulge. No significant disc height loss. No significant spinal canal or neuroforaminal stenosis. Severe hypertrophic facet joint degeneration.  L5-S1: Disc bulge. No significant disc height loss. No significant spinal canal or neuroforaminal stenosis. Mild hypertrophic facet joint degeneration.  Paraspinal Soft Tissues/Retroperitoneum: Unremarkable. ____________________ IMPRESSION:  1.  Multilevel lumbar spondylosis as detailed above with no significant spinal canal or neuroforaminal stenosis..  --- End of Report ---       AMADOR SMALL MD; Attending Radiologist This document has been electronically signed. Jul 23 2024 10:15AM
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
## 2024-07-29 NOTE — ASSESSMENT
[FreeTextEntry1] : Ms. RUBIN STYLES is a 43 year old female who presents with chronic low back pain, likely due to underlying spondylosis. Denies any red flag signs. Will recommend: - MRI L Spine reviewed with patient - Will continue PT 2-3x/week for stretching, strengthening (especially of core muscles), ROM exercises, HEP and modalities PRN including myofascial release, moist heat - Discussed R/B/A of injection therapy such as MBB/RFAs for which we will await to see how she does with more PT.  RTC in 4 weeks. Patient aware of red flag signs including any changes to their bowel/bladder control, groin numbness or new weakness. Patient knows to seek immediate attention by calling 911 or going to nearest ER if these symptoms appear.  This patient is being managed for a complex chronic pain that requires ongoing medical management. The nature of this condition requires a longitudinal relationship and monitoring over time for appropriate treatment.
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
## 2024-07-29 NOTE — DATA REVIEWED
[FreeTextEntry1] : EXAM: [id number] - MR SPINE LUMBAR  - ORDERED BY: KAREN OTTO   PROCEDURE DATE:  07/18/2024    INTERPRETATION:  MR LUMBAR SPINE WITHOUT CONTRAST  TECHNIQUE: Multiplanar multisequence imaging of the lumbar spine was performed without the administration of intravenous contrast.  CLINICAL INDICATION: Persistent low back pain for several months. Lumbar spondylosis. COMPARISON: Lumbar spine radiographs 5/22/2024. ____________________ FINDINGS: Mildly degraded by motion artifact. L5-S1 is at image 46 of series 10. There are 5 lumbar type vertebral bodies. Sacralization of L5 bilaterally.  Very mild lower lumbar levoscoliosis with an apex near L4. Very mild upper lumbar dextroscoliosis with an apex near L1-L2. Lumbar alignment otherwise maintained. Visualized vertebral body heights are maintained. No acute fractures. No aggressive osseous lesions.  Conus has a normal appearance and terminates at L1.  Decreased T2 signal of the majority of the lumbar disks compatible with degenerative disc disease.  T12-L1: Disc bulge. No significant disc height loss. No significant spinal canal or neuroforaminal stenosis. Very mild hypertrophic facet joint degeneration.  L1-L2: Disc bulge. No significant disc height loss. No significant spinal canal or neuroforaminal stenosis. Mild hypertrophic facet joint degeneration with bilateral facet joint effusions.  L2-L3: Disc bulge. No significant disc height loss. No significant spinal canal or neuroforaminal stenosis. Mild hypertrophic facet joint degeneration.  L3-L4: Disc bulge. No significant disc height loss. No significant spinal canal or neuroforaminal stenosis. Moderate hypertrophic facet joint degeneration.  L4-L5: Disc bulge. No significant disc height loss. No significant spinal canal or neuroforaminal stenosis. Severe hypertrophic facet joint degeneration.  L5-S1: Disc bulge. No significant disc height loss. No significant spinal canal or neuroforaminal stenosis. Mild hypertrophic facet joint degeneration.  Paraspinal Soft Tissues/Retroperitoneum: Unremarkable. ____________________ IMPRESSION:  1.  Multilevel lumbar spondylosis as detailed above with no significant spinal canal or neuroforaminal stenosis..  --- End of Report ---       AMADOR SMALL MD; Attending Radiologist This document has been electronically signed. Jul 23 2024 10:15AM

## 2024-08-06 ENCOUNTER — APPOINTMENT (OUTPATIENT)
Dept: INTERNAL MEDICINE | Facility: CLINIC | Age: 44
End: 2024-08-06

## 2024-08-06 ENCOUNTER — APPOINTMENT (OUTPATIENT)
Dept: FAMILY MEDICINE | Facility: CLINIC | Age: 44
End: 2024-08-06

## 2024-08-06 ENCOUNTER — NON-APPOINTMENT (OUTPATIENT)
Age: 44
End: 2024-08-06

## 2024-08-06 PROBLEM — Z86.018 HISTORY OF CHERRY ANGIOMA: Status: RESOLVED | Noted: 2017-03-06 | Resolved: 2024-08-06

## 2024-08-06 PROBLEM — Z86.018 HISTORY OF NEVUS: Status: RESOLVED | Noted: 2017-03-06 | Resolved: 2024-08-06

## 2024-08-06 PROBLEM — L30.8 ASTEATOTIC DERMATITIS: Status: RESOLVED | Noted: 2018-03-12 | Resolved: 2024-08-06

## 2024-08-06 PROBLEM — Z87.2 HISTORY OF SEBORRHEIC DERMATITIS: Status: RESOLVED | Noted: 2017-03-06 | Resolved: 2024-08-06

## 2024-08-06 PROBLEM — Z00.00 ANNUAL PHYSICAL EXAM: Status: ACTIVE | Noted: 2024-08-06

## 2024-08-06 PROBLEM — G47.33 OBSTRUCTIVE SLEEP APNEA, ADULT: Status: ACTIVE | Noted: 2024-08-06

## 2024-08-06 PROBLEM — Z87.898 HISTORY OF WEIGHT GAIN: Status: RESOLVED | Noted: 2024-02-01 | Resolved: 2024-08-06

## 2024-08-06 PROBLEM — Z23 ENCOUNTER FOR IMMUNIZATION: Status: RESOLVED | Noted: 2023-05-31 | Resolved: 2024-08-06

## 2024-08-06 PROBLEM — L82.0 SEBORRHEIC KERATOSIS, INFLAMED: Status: RESOLVED | Noted: 2023-09-06 | Resolved: 2024-08-06

## 2024-08-06 PROBLEM — Z82.49 FAMILY HISTORY OF PREMATURE VENTRICULAR CONTRACTIONS: Status: ACTIVE | Noted: 2024-08-06

## 2024-08-06 PROBLEM — Z86.03 HISTORY OF NEOPLASM OF UNCERTAIN BEHAVIOR OF SKIN: Status: RESOLVED | Noted: 2023-12-12 | Resolved: 2024-08-06

## 2024-08-06 PROBLEM — Z86.018: Status: RESOLVED | Noted: 2023-12-12 | Resolved: 2024-08-06

## 2024-08-06 PROBLEM — Z86.018 HISTORY OF MELANOCYTIC NEVI OF TRUNK: Status: RESOLVED | Noted: 2023-09-06 | Resolved: 2024-08-06

## 2024-08-06 PROBLEM — Z82.49 FAMILY HISTORY OF ACUTE MYOCARDIAL INFARCTION: Status: ACTIVE | Noted: 2024-08-06

## 2024-08-06 PROBLEM — G47.9 SLEEP DISORDER, UNSPECIFIED: Status: ACTIVE | Noted: 2024-08-06

## 2024-08-06 PROCEDURE — 99386 PREV VISIT NEW AGE 40-64: CPT | Mod: 25

## 2024-08-06 PROCEDURE — G0447 BEHAVIOR COUNSEL OBESITY 15M: CPT | Mod: 59

## 2024-08-06 PROCEDURE — 93000 ELECTROCARDIOGRAM COMPLETE: CPT

## 2024-08-06 NOTE — COUNSELING
[Potential consequences of obesity discussed] : Potential consequences of obesity discussed [Benefits of weight loss discussed] : Benefits of weight loss discussed [FreeTextEntry4] : 5

## 2024-08-06 NOTE — HEALTH RISK ASSESSMENT
[Never (0 pts)] : Never (0 points) [No] : In the past 12 months have you used drugs other than those required for medical reasons? No [0] : 2) Feeling down, depressed, or hopeless: Not at all (0) [Patient reported mammogram was normal] : Patient reported mammogram was normal [Patient reported PAP Smear was normal] : Patient reported PAP Smear was normal [Very Good] : ~his/her~  mood as very good [1 or 2 (0 pts)] : 1 or 2 (0 points) [PHQ-2 Negative - No further assessment needed] : PHQ-2 Negative - No further assessment needed [Audit-CScore] : 0 [de-identified] : walks multiple flights of stairs with work [de-identified] : alot of take out and carbs, does not like fruit [DNI0Peeie] : 0 [Never] : Never [EyeExamDate] : 08/2023 [None] : None [Employed] : employed [Fully functional (bathing, dressing, toileting, transferring, walking, feeding)] : Fully functional (bathing, dressing, toileting, transferring, walking, feeding) [Fully functional (using the telephone, shopping, preparing meals, housekeeping, doing laundry, using] : Fully functional and needs no help or supervision to perform IADLs (using the telephone, shopping, preparing meals, housekeeping, doing laundry, using transportation, managing medications and managing finances) [Reports changes in hearing] : Reports no changes in hearing [Reports changes in vision] : Reports no changes in vision [Reports changes in dental health] : Reports no changes in dental health [MammogramDate] : 06/20223 [PapSmearDate] : 10/2023

## 2024-08-06 NOTE — HISTORY OF PRESENT ILLNESS
[FreeTextEntry1] : NPA-CPE [de-identified] : 43 year F presents for annual physical exam. PMH anxiety, obesity, HLD, HTN, prediabetes Feeling well  Reports trouble losing weight.  She does not cook, She only has a air fryer at home. She eats alot of takeout. Has history of food addiction Addicted to cheese, mozzarella sticks, pizza.  She has been evaluated by Bariatric and does not want surgery She was evaluated by weight loss management program with dietitian and counselor over Zoom and felt it was too impersonal for her.

## 2024-08-13 ENCOUNTER — TRANSCRIPTION ENCOUNTER (OUTPATIENT)
Age: 44
End: 2024-08-13

## 2024-08-15 ENCOUNTER — TRANSCRIPTION ENCOUNTER (OUTPATIENT)
Age: 44
End: 2024-08-15

## 2024-08-28 ENCOUNTER — NON-APPOINTMENT (OUTPATIENT)
Age: 44
End: 2024-08-28

## 2024-08-30 ENCOUNTER — APPOINTMENT (OUTPATIENT)
Dept: PHYSICAL MEDICINE AND REHAB | Facility: CLINIC | Age: 44
End: 2024-08-30

## 2024-09-06 ENCOUNTER — APPOINTMENT (OUTPATIENT)
Dept: FAMILY MEDICINE | Facility: CLINIC | Age: 44
End: 2024-09-06

## 2024-09-25 ENCOUNTER — APPOINTMENT (OUTPATIENT)
Dept: DERMATOLOGY | Facility: CLINIC | Age: 44
End: 2024-09-25
Payer: COMMERCIAL

## 2024-09-25 DIAGNOSIS — Z86.018 PERSONAL HISTORY OF OTHER BENIGN NEOPLASM: ICD-10-CM

## 2024-09-25 DIAGNOSIS — L21.9 SEBORRHEIC DERMATITIS, UNSPECIFIED: ICD-10-CM

## 2024-09-25 DIAGNOSIS — L82.1 OTHER SEBORRHEIC KERATOSIS: ICD-10-CM

## 2024-09-25 DIAGNOSIS — L81.4 OTHER MELANIN HYPERPIGMENTATION: ICD-10-CM

## 2024-09-25 DIAGNOSIS — L30.1 DYSHIDROSIS [POMPHOLYX]: ICD-10-CM

## 2024-09-25 PROCEDURE — 99214 OFFICE O/P EST MOD 30 MIN: CPT

## 2024-09-25 RX ORDER — MOMETASONE FUROATE 1 MG/G
0.1 OINTMENT TOPICAL
Qty: 1 | Refills: 3 | Status: ACTIVE | COMMUNITY
Start: 2024-09-25 | End: 1900-01-01

## 2024-09-25 NOTE — PHYSICAL EXAM
[Full Body Skin Exam Performed] : performed [FreeTextEntry3] : Skin examination performed of the face, neck, trunk, arms, legs;  The patient is well, alert and oriented, pleasant and cooperative. Eyelids, conjunctivae, oral mucosa, digits and nails all normal.   No cervical adenopathy.  Normal findings include:  Multiple benign nevi were noted.  Scaling patches located on scalp;  Angiomas Lentigines small Scaling waxy stuck on papules; on arms, tags neck + [id number] patches and deep seated papules on fingers  No lesions were suspicious for malignancy.

## 2024-09-25 NOTE — HISTORY OF PRESENT ILLNESS
[de-identified] : Pt. presents for skin check; c/o few spots of concern;  Severity:  mild   Modifying factors:  none Associated symptoms:  none Context:  no association with activity  also:  hx seb derm tx with Rxs;  c/o new breakouts on hands, itchy; no treatments

## 2024-09-25 NOTE — ASSESSMENT
[FreeTextEntry1] : Complete skin examination is negative for malignancy; Multiple new concerns were addressed and discussed. Therapeutic options and their risks and benefits; along with multiple diagnostic possibilities were discussed at length; risks and benefits of skin biopsy and/or other further study were discussed;   renew ketoconazole cream for face, mometasone soln for ears- seb derm  dyshidrotic hand eczema;  new onset mometasone ointment prn  Continue regular exams; Follow up for TBSE in 1 year

## 2024-10-09 ENCOUNTER — NON-APPOINTMENT (OUTPATIENT)
Age: 44
End: 2024-10-09

## 2024-10-25 ENCOUNTER — APPOINTMENT (OUTPATIENT)
Dept: OBGYN | Facility: CLINIC | Age: 44
End: 2024-10-25
Payer: COMMERCIAL

## 2024-10-25 VITALS
HEIGHT: 62 IN | BODY MASS INDEX: 48.95 KG/M2 | HEART RATE: 80 BPM | RESPIRATION RATE: 18 BRPM | WEIGHT: 266 LBS | SYSTOLIC BLOOD PRESSURE: 130 MMHG | DIASTOLIC BLOOD PRESSURE: 78 MMHG

## 2024-10-25 DIAGNOSIS — Z12.31 ENCOUNTER FOR SCREENING MAMMOGRAM FOR MALIGNANT NEOPLASM OF BREAST: ICD-10-CM

## 2024-10-25 DIAGNOSIS — Z01.419 ENCOUNTER FOR GYNECOLOGICAL EXAMINATION (GENERAL) (ROUTINE) W/OUT ABNORMAL FINDINGS: ICD-10-CM

## 2024-10-25 PROCEDURE — 99396 PREV VISIT EST AGE 40-64: CPT

## 2024-12-06 ENCOUNTER — APPOINTMENT (OUTPATIENT)
Dept: MAMMOGRAPHY | Facility: CLINIC | Age: 44
End: 2024-12-06

## 2025-04-21 ENCOUNTER — APPOINTMENT (OUTPATIENT)
Dept: INTERNAL MEDICINE | Facility: CLINIC | Age: 45
End: 2025-04-21
Payer: COMMERCIAL

## 2025-04-21 DIAGNOSIS — Z11.1 ENCOUNTER FOR SCREENING FOR RESPIRATORY TUBERCULOSIS: ICD-10-CM

## 2025-04-21 PROCEDURE — 36415 COLL VENOUS BLD VENIPUNCTURE: CPT

## 2025-04-24 DIAGNOSIS — Z02.1 ENCOUNTER FOR PRE-EMPLOYMENT EXAMINATION: ICD-10-CM
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
## 2025-09-05 ENCOUNTER — APPOINTMENT (OUTPATIENT)
Dept: FAMILY MEDICINE | Facility: CLINIC | Age: 45
End: 2025-09-05